# Patient Record
Sex: MALE | Race: WHITE | Employment: FULL TIME | URBAN - METROPOLITAN AREA
[De-identification: names, ages, dates, MRNs, and addresses within clinical notes are randomized per-mention and may not be internally consistent; named-entity substitution may affect disease eponyms.]

---

## 2017-04-13 ENCOUNTER — OFFICE VISIT (OUTPATIENT)
Dept: FAMILY MEDICINE CLINIC | Age: 57
End: 2017-04-13
Payer: MEDICARE

## 2017-04-13 VITALS
WEIGHT: 248 LBS | HEART RATE: 87 BPM | SYSTOLIC BLOOD PRESSURE: 128 MMHG | BODY MASS INDEX: 36.73 KG/M2 | TEMPERATURE: 97.5 F | DIASTOLIC BLOOD PRESSURE: 70 MMHG | HEIGHT: 69 IN

## 2017-04-13 DIAGNOSIS — R21 RASH AND NONSPECIFIC SKIN ERUPTION: Primary | ICD-10-CM

## 2017-04-13 PROBLEM — Z86.718 H/O DEEP VENOUS THROMBOSIS: Status: ACTIVE | Noted: 2017-02-27

## 2017-04-13 PROBLEM — I87.009 POST-PHLEBITIC SYNDROME: Status: ACTIVE | Noted: 2017-02-27

## 2017-04-13 PROCEDURE — 99212 OFFICE O/P EST SF 10 MIN: CPT | Performed by: FAMILY MEDICINE

## 2017-04-13 ASSESSMENT — PATIENT HEALTH QUESTIONNAIRE - PHQ9
SUM OF ALL RESPONSES TO PHQ9 QUESTIONS 1 & 2: 0
1. LITTLE INTEREST OR PLEASURE IN DOING THINGS: 0
2. FEELING DOWN, DEPRESSED OR HOPELESS: 0
SUM OF ALL RESPONSES TO PHQ QUESTIONS 1-9: 0

## 2017-04-13 ASSESSMENT — ENCOUNTER SYMPTOMS
ABDOMINAL PAIN: 0
SORE THROAT: 0
SHORTNESS OF BREATH: 0
NAUSEA: 0

## 2017-04-27 ENCOUNTER — TELEPHONE (OUTPATIENT)
Dept: FAMILY MEDICINE CLINIC | Age: 57
End: 2017-04-27

## 2017-05-05 ENCOUNTER — TELEPHONE (OUTPATIENT)
Dept: FAMILY MEDICINE CLINIC | Age: 57
End: 2017-05-05

## 2018-04-09 ENCOUNTER — OFFICE VISIT (OUTPATIENT)
Dept: FAMILY MEDICINE CLINIC | Age: 58
End: 2018-04-09
Payer: MEDICARE

## 2018-04-09 VITALS
OXYGEN SATURATION: 98 % | DIASTOLIC BLOOD PRESSURE: 82 MMHG | WEIGHT: 264 LBS | SYSTOLIC BLOOD PRESSURE: 129 MMHG | BODY MASS INDEX: 39.1 KG/M2 | HEART RATE: 90 BPM | TEMPERATURE: 97.4 F | HEIGHT: 69 IN

## 2018-04-09 DIAGNOSIS — M25.561 ACUTE PAIN OF RIGHT KNEE: ICD-10-CM

## 2018-04-09 DIAGNOSIS — R35.0 FREQUENCY OF URINATION: Primary | ICD-10-CM

## 2018-04-09 LAB
BILIRUBIN, POC: NEGATIVE
BLOOD URINE, POC: NEGATIVE
CLARITY, POC: CLEAR
COLOR, POC: YELLOW
GLUCOSE URINE, POC: NEGATIVE
KETONES, POC: NEGATIVE
LEUKOCYTE EST, POC: NEGATIVE
NITRITE, POC: NEGATIVE
PH, POC: 6.5
PROTEIN, POC: NEGATIVE
SPECIFIC GRAVITY, POC: 1.03
UROBILINOGEN, POC: 0.2

## 2018-04-09 PROCEDURE — 99214 OFFICE O/P EST MOD 30 MIN: CPT | Performed by: FAMILY MEDICINE

## 2018-04-09 PROCEDURE — 3017F COLORECTAL CA SCREEN DOC REV: CPT | Performed by: FAMILY MEDICINE

## 2018-04-09 PROCEDURE — G8417 CALC BMI ABV UP PARAM F/U: HCPCS | Performed by: FAMILY MEDICINE

## 2018-04-09 PROCEDURE — 1036F TOBACCO NON-USER: CPT | Performed by: FAMILY MEDICINE

## 2018-04-09 PROCEDURE — 81003 URINALYSIS AUTO W/O SCOPE: CPT | Performed by: FAMILY MEDICINE

## 2018-04-09 PROCEDURE — G8427 DOCREV CUR MEDS BY ELIG CLIN: HCPCS | Performed by: FAMILY MEDICINE

## 2018-04-09 RX ORDER — HYDROCODONE BITARTRATE AND ACETAMINOPHEN 5; 325 MG/1; MG/1
1 TABLET ORAL NIGHTLY
Qty: 10 TABLET | Refills: 0 | Status: SHIPPED | OUTPATIENT
Start: 2018-04-09 | End: 2018-04-19

## 2018-04-09 ASSESSMENT — ENCOUNTER SYMPTOMS
VOMITING: 0
GASTROINTESTINAL NEGATIVE: 1
EYES NEGATIVE: 1
RESPIRATORY NEGATIVE: 1
ALLERGIC/IMMUNOLOGIC NEGATIVE: 1

## 2018-04-10 ENCOUNTER — HOSPITAL ENCOUNTER (OUTPATIENT)
Age: 58
Discharge: HOME | End: 2018-04-10
Payer: MEDICARE

## 2018-04-10 DIAGNOSIS — R35.0 FREQUENCY OF URINATION: ICD-10-CM

## 2018-04-10 DIAGNOSIS — M25.561 ACUTE PAIN OF RIGHT KNEE: ICD-10-CM

## 2018-04-10 LAB
CHOLESTEROL/HDL RATIO: 4.7
CHOLESTEROL: 189 MG/DL
ESTIMATED AVERAGE GLUCOSE: 128 MG/DL
HBA1C MFR BLD: 6.1 % (ref 4–6)
HDLC SERPL-MCNC: 40 MG/DL
LDL CHOLESTEROL: 101 MG/DL (ref 0–130)
PROSTATE SPECIFIC ANTIGEN: 1.11 UG/L
TRIGL SERPL-MCNC: 239 MG/DL
VLDLC SERPL CALC-MCNC: ABNORMAL MG/DL (ref 1–30)

## 2018-04-10 PROCEDURE — G0103 PSA SCREENING: HCPCS

## 2018-04-10 PROCEDURE — 80061 LIPID PANEL: CPT

## 2018-04-10 PROCEDURE — 83036 HEMOGLOBIN GLYCOSYLATED A1C: CPT

## 2018-04-10 PROCEDURE — 36415 COLL VENOUS BLD VENIPUNCTURE: CPT

## 2018-04-11 ENCOUNTER — HOSPITAL ENCOUNTER (OUTPATIENT)
Dept: VASCULAR LAB | Age: 58
Discharge: HOME | End: 2018-04-11
Payer: MEDICARE

## 2018-04-11 DIAGNOSIS — R35.0 FREQUENCY OF URINATION: ICD-10-CM

## 2018-04-11 DIAGNOSIS — M25.561 ACUTE PAIN OF RIGHT KNEE: ICD-10-CM

## 2018-04-11 PROCEDURE — 93971 EXTREMITY STUDY: CPT

## 2018-04-13 ENCOUNTER — TELEPHONE (OUTPATIENT)
Dept: FAMILY MEDICINE CLINIC | Age: 58
End: 2018-04-13

## 2019-11-13 ENCOUNTER — HOSPITAL ENCOUNTER (EMERGENCY)
Age: 59
Discharge: HOME | End: 2019-11-13
Attending: EMERGENCY MEDICINE
Payer: MEDICARE

## 2019-11-13 VITALS
DIASTOLIC BLOOD PRESSURE: 88 MMHG | WEIGHT: 264 LBS | OXYGEN SATURATION: 97 % | HEART RATE: 97 BPM | BODY MASS INDEX: 39.1 KG/M2 | TEMPERATURE: 98.2 F | HEIGHT: 69 IN | RESPIRATION RATE: 16 BRPM | SYSTOLIC BLOOD PRESSURE: 170 MMHG

## 2019-11-13 DIAGNOSIS — R30.0 DYSURIA: Primary | ICD-10-CM

## 2019-11-13 DIAGNOSIS — N34.2 URETHRITIS: ICD-10-CM

## 2019-11-13 DIAGNOSIS — N30.00 ACUTE CYSTITIS WITHOUT HEMATURIA: ICD-10-CM

## 2019-11-13 LAB
-: ABNORMAL
AMORPHOUS: ABNORMAL
BACTERIA: ABNORMAL
BILIRUBIN URINE: NEGATIVE
CASTS UA: ABNORMAL /LPF
COLOR: YELLOW
COMMENT UA: ABNORMAL
CRYSTALS, UA: ABNORMAL /HPF
EPITHELIAL CELLS UA: ABNORMAL /HPF
GLUCOSE URINE: NEGATIVE
KETONES, URINE: NEGATIVE
LEUKOCYTE ESTERASE, URINE: ABNORMAL
MUCUS: ABNORMAL
NITRITE, URINE: NEGATIVE
OTHER OBSERVATIONS UA: ABNORMAL
PH UA: 7 (ref 5–8)
PROTEIN UA: ABNORMAL
RBC UA: ABNORMAL /HPF
RENAL EPITHELIAL, UA: ABNORMAL /HPF
SPECIFIC GRAVITY UA: 1.02 (ref 1–1.03)
TRICHOMONAS: ABNORMAL
TURBIDITY: ABNORMAL
URINE HGB: ABNORMAL
UROBILINOGEN, URINE: NORMAL
WBC UA: ABNORMAL /HPF
YEAST: ABNORMAL

## 2019-11-13 PROCEDURE — 99283 EMERGENCY DEPT VISIT LOW MDM: CPT

## 2019-11-13 PROCEDURE — 81001 URINALYSIS AUTO W/SCOPE: CPT

## 2019-11-13 PROCEDURE — 6360000002 HC RX W HCPCS: Performed by: PHYSICIAN ASSISTANT

## 2019-11-13 PROCEDURE — 87491 CHLMYD TRACH DNA AMP PROBE: CPT

## 2019-11-13 PROCEDURE — 6370000000 HC RX 637 (ALT 250 FOR IP): Performed by: PHYSICIAN ASSISTANT

## 2019-11-13 PROCEDURE — 96372 THER/PROPH/DIAG INJ SC/IM: CPT

## 2019-11-13 PROCEDURE — 87591 N.GONORRHOEAE DNA AMP PROB: CPT

## 2019-11-13 RX ORDER — CEFTRIAXONE SODIUM 250 MG/1
250 INJECTION, POWDER, FOR SOLUTION INTRAMUSCULAR; INTRAVENOUS ONCE
Status: COMPLETED | OUTPATIENT
Start: 2019-11-13 | End: 2019-11-13

## 2019-11-13 RX ORDER — SULFAMETHOXAZOLE AND TRIMETHOPRIM 800; 160 MG/1; MG/1
1 TABLET ORAL 2 TIMES DAILY
Qty: 14 TABLET | Refills: 0 | Status: SHIPPED | OUTPATIENT
Start: 2019-11-13 | End: 2019-11-20

## 2019-11-13 RX ORDER — AZITHROMYCIN 250 MG/1
1000 TABLET, FILM COATED ORAL ONCE
Status: COMPLETED | OUTPATIENT
Start: 2019-11-13 | End: 2019-11-13

## 2019-11-13 RX ADMIN — CEFTRIAXONE SODIUM 250 MG: 250 INJECTION, POWDER, FOR SOLUTION INTRAMUSCULAR; INTRAVENOUS at 20:48

## 2019-11-13 RX ADMIN — AZITHROMYCIN 1000 MG: 250 TABLET, FILM COATED ORAL at 20:47

## 2019-11-14 ENCOUNTER — TELEPHONE (OUTPATIENT)
Dept: FAMILY MEDICINE CLINIC | Age: 59
End: 2019-11-14

## 2019-11-14 LAB
C. TRACHOMATIS DNA ,URINE: NEGATIVE
N. GONORRHOEAE DNA, URINE: ABNORMAL
SPECIMEN DESCRIPTION: ABNORMAL

## 2020-05-24 ENCOUNTER — HOSPITAL ENCOUNTER (OUTPATIENT)
Age: 60
Setting detail: OBSERVATION
Discharge: HOME | End: 2020-05-26
Attending: EMERGENCY MEDICINE | Admitting: SURGERY
Payer: MEDICARE

## 2020-05-24 ENCOUNTER — APPOINTMENT (OUTPATIENT)
Dept: CT IMAGING | Age: 60
End: 2020-05-24
Payer: MEDICARE

## 2020-05-24 ENCOUNTER — APPOINTMENT (OUTPATIENT)
Dept: GENERAL RADIOLOGY | Age: 60
End: 2020-05-24
Payer: MEDICARE
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
## 2020-05-24 LAB
ABO/RH: NORMAL
ALLEN TEST: ABNORMAL
ANION GAP SERPL CALCULATED.3IONS-SCNC: 13 MMOL/L (ref 9–17)
ANTIBODY SCREEN: NEGATIVE
ARM BAND NUMBER: NORMAL
BLOOD BANK SPECIMEN: ABNORMAL
BUN BLDV-MCNC: 23 MG/DL (ref 8–23)
CARBOXYHEMOGLOBIN: 0.6 % (ref 0–5)
CHLORIDE BLD-SCNC: 107 MMOL/L (ref 98–107)
CO2: 20 MMOL/L (ref 20–31)
CREAT SERPL-MCNC: 1.18 MG/DL (ref 0.7–1.2)
ETHANOL PERCENT: <0.01 %
ETHANOL: <10 MG/DL
EXPIRATION DATE: NORMAL
FIO2: ABNORMAL
GFR AFRICAN AMERICAN: ABNORMAL ML/MIN
GFR NON-AFRICAN AMERICAN: ABNORMAL ML/MIN
GFR SERPL CREATININE-BSD FRML MDRD: ABNORMAL ML/MIN/{1.73_M2}
GFR SERPL CREATININE-BSD FRML MDRD: ABNORMAL ML/MIN/{1.73_M2}
GLUCOSE BLD-MCNC: 105 MG/DL (ref 70–99)
HCG QUALITATIVE: ABNORMAL
HCO3 VENOUS: 25.9 MMOL/L (ref 24–30)
HCT VFR BLD CALC: 44.9 % (ref 40.7–50.3)
HEMOGLOBIN: 14.3 G/DL (ref 13–17)
INR BLD: 1
MCH RBC QN AUTO: 33.5 PG (ref 25.2–33.5)
MCHC RBC AUTO-ENTMCNC: 31.8 G/DL (ref 28.4–34.8)
MCV RBC AUTO: 105.2 FL (ref 82.6–102.9)
METHEMOGLOBIN: ABNORMAL % (ref 0–1.5)
MODE: ABNORMAL
NEGATIVE BASE EXCESS, VEN: ABNORMAL MMOL/L (ref 0–2)
NOTIFICATION TIME: ABNORMAL
NOTIFICATION: ABNORMAL
NRBC AUTOMATED: 0 PER 100 WBC
O2 DEVICE/FLOW/%: ABNORMAL
O2 SAT, VEN: 57.6 % (ref 60–85)
OXYHEMOGLOBIN: ABNORMAL % (ref 95–98)
PARTIAL THROMBOPLASTIN TIME: 18.8 SEC (ref 20.5–30.5)
PATIENT TEMP: 37
PCO2, VEN, TEMP ADJ: ABNORMAL MMHG (ref 39–55)
PCO2, VEN: 44 (ref 39–55)
PDW BLD-RTO: 12.5 % (ref 11.8–14.4)
PEEP/CPAP: ABNORMAL
PH VENOUS: 7.39 (ref 7.32–7.42)
PH, VEN, TEMP ADJ: ABNORMAL (ref 7.32–7.42)
PLATELET # BLD: 191 K/UL (ref 138–453)
PMV BLD AUTO: 10.5 FL (ref 8.1–13.5)
PO2, VEN, TEMP ADJ: ABNORMAL MMHG (ref 30–50)
PO2, VEN: 34.9 (ref 30–50)
POSITIVE BASE EXCESS, VEN: 1.1 MMOL/L (ref 0–2)
POTASSIUM SERPL-SCNC: 4.1 MMOL/L (ref 3.7–5.3)
PROTHROMBIN TIME: 10.4 SEC (ref 9–12)
PSV: ABNORMAL
PT. POSITION: ABNORMAL
RBC # BLD: 4.27 M/UL (ref 4.21–5.77)
RESPIRATORY RATE: ABNORMAL
SAMPLE SITE: ABNORMAL
SET RATE: ABNORMAL
SODIUM BLD-SCNC: 140 MMOL/L (ref 135–144)
TEXT FOR RESPIRATORY: ABNORMAL
TOTAL HB: ABNORMAL G/DL (ref 12–16)
TOTAL RATE: ABNORMAL
VT: ABNORMAL
WBC # BLD: 7.7 K/UL (ref 3.5–11.3)

## 2020-05-24 PROCEDURE — 74177 CT ABD & PELVIS W/CONTRAST: CPT

## 2020-05-24 PROCEDURE — 84703 CHORIONIC GONADOTROPIN ASSAY: CPT

## 2020-05-24 PROCEDURE — 12001 RPR S/N/AX/GEN/TRNK 2.5CM/<: CPT

## 2020-05-24 PROCEDURE — G0378 HOSPITAL OBSERVATION PER HR: HCPCS

## 2020-05-24 PROCEDURE — 85027 COMPLETE CBC AUTOMATED: CPT

## 2020-05-24 PROCEDURE — 86900 BLOOD TYPING SEROLOGIC ABO: CPT

## 2020-05-24 PROCEDURE — 86901 BLOOD TYPING SEROLOGIC RH(D): CPT

## 2020-05-24 PROCEDURE — 72125 CT NECK SPINE W/O DYE: CPT

## 2020-05-24 PROCEDURE — 86850 RBC ANTIBODY SCREEN: CPT

## 2020-05-24 PROCEDURE — 73130 X-RAY EXAM OF HAND: CPT

## 2020-05-24 PROCEDURE — 72131 CT LUMBAR SPINE W/O DYE: CPT

## 2020-05-24 PROCEDURE — G0480 DRUG TEST DEF 1-7 CLASSES: HCPCS

## 2020-05-24 PROCEDURE — 73562 X-RAY EXAM OF KNEE 3: CPT

## 2020-05-24 PROCEDURE — 6360000004 HC RX CONTRAST MEDICATION: Performed by: EMERGENCY MEDICINE

## 2020-05-24 PROCEDURE — 72128 CT CHEST SPINE W/O DYE: CPT

## 2020-05-24 PROCEDURE — 93005 ELECTROCARDIOGRAM TRACING: CPT | Performed by: SURGERY

## 2020-05-24 PROCEDURE — 70450 CT HEAD/BRAIN W/O DYE: CPT

## 2020-05-24 PROCEDURE — 85610 PROTHROMBIN TIME: CPT

## 2020-05-24 PROCEDURE — 82947 ASSAY GLUCOSE BLOOD QUANT: CPT

## 2020-05-24 PROCEDURE — 82805 BLOOD GASES W/O2 SATURATION: CPT

## 2020-05-24 PROCEDURE — 84520 ASSAY OF UREA NITROGEN: CPT

## 2020-05-24 PROCEDURE — 80051 ELECTROLYTE PANEL: CPT

## 2020-05-24 PROCEDURE — 71045 X-RAY EXAM CHEST 1 VIEW: CPT

## 2020-05-24 PROCEDURE — 82565 ASSAY OF CREATININE: CPT

## 2020-05-24 PROCEDURE — 99285 EMERGENCY DEPT VISIT HI MDM: CPT

## 2020-05-24 PROCEDURE — 12011 RPR F/E/E/N/L/M 2.5 CM/<: CPT

## 2020-05-24 PROCEDURE — 85730 THROMBOPLASTIN TIME PARTIAL: CPT

## 2020-05-24 RX ORDER — FENTANYL CITRATE 50 UG/ML
INJECTION, SOLUTION INTRAMUSCULAR; INTRAVENOUS
Status: DISPENSED
Start: 2020-05-24 | End: 2020-05-25

## 2020-05-24 RX ORDER — CEFAZOLIN SODIUM 1 G/50ML
INJECTION, SOLUTION INTRAVENOUS
Status: DISPENSED
Start: 2020-05-24 | End: 2020-05-25

## 2020-05-24 RX ORDER — ONDANSETRON 2 MG/ML
4 INJECTION INTRAMUSCULAR; INTRAVENOUS EVERY 6 HOURS PRN
Status: CANCELLED | OUTPATIENT
Start: 2020-05-24

## 2020-05-24 RX ORDER — PROMETHAZINE HYDROCHLORIDE 25 MG/1
12.5 TABLET ORAL EVERY 6 HOURS PRN
Status: CANCELLED | OUTPATIENT
Start: 2020-05-24

## 2020-05-24 RX ORDER — ACETAMINOPHEN 500 MG
1000 TABLET ORAL EVERY 8 HOURS SCHEDULED
Status: DISCONTINUED | OUTPATIENT
Start: 2020-05-24 | End: 2020-05-26 | Stop reason: HOSPADM

## 2020-05-24 RX ORDER — FENTANYL CITRATE 50 UG/ML
50 INJECTION, SOLUTION INTRAMUSCULAR; INTRAVENOUS ONCE
Status: DISCONTINUED | OUTPATIENT
Start: 2020-05-24 | End: 2020-05-26 | Stop reason: HOSPADM

## 2020-05-24 RX ADMIN — IOHEXOL 130 ML: 350 INJECTION, SOLUTION INTRAVENOUS at 21:34

## 2020-05-25 ENCOUNTER — APPOINTMENT (OUTPATIENT)
Dept: GENERAL RADIOLOGY | Age: 60
End: 2020-05-25
Payer: MEDICARE

## 2020-05-25 LAB
ANION GAP SERPL CALCULATED.3IONS-SCNC: 11 MMOL/L (ref 9–17)
BUN BLDV-MCNC: 23 MG/DL (ref 8–23)
BUN/CREAT BLD: ABNORMAL (ref 9–20)
CALCIUM SERPL-MCNC: 8.4 MG/DL (ref 8.6–10.4)
CHLORIDE BLD-SCNC: 105 MMOL/L (ref 98–107)
CO2: 22 MMOL/L (ref 20–31)
CREAT SERPL-MCNC: 0.95 MG/DL (ref 0.7–1.2)
GFR AFRICAN AMERICAN: >60 ML/MIN
GFR NON-AFRICAN AMERICAN: >60 ML/MIN
GFR SERPL CREATININE-BSD FRML MDRD: ABNORMAL ML/MIN/{1.73_M2}
GFR SERPL CREATININE-BSD FRML MDRD: ABNORMAL ML/MIN/{1.73_M2}
GLUCOSE BLD-MCNC: 154 MG/DL (ref 70–99)
HCT VFR BLD CALC: 39 % (ref 40.7–50.3)
HEMOGLOBIN: 12.8 G/DL (ref 13–17)
INR BLD: 1
MCH RBC QN AUTO: 32.5 PG (ref 25.2–33.5)
MCHC RBC AUTO-ENTMCNC: 32.8 G/DL (ref 28.4–34.8)
MCV RBC AUTO: 99 FL (ref 82.6–102.9)
NRBC AUTOMATED: 0 PER 100 WBC
PDW BLD-RTO: 12.5 % (ref 11.8–14.4)
PLATELET # BLD: 204 K/UL (ref 138–453)
PMV BLD AUTO: 10.6 FL (ref 8.1–13.5)
POTASSIUM SERPL-SCNC: 4.3 MMOL/L (ref 3.7–5.3)
PROTHROMBIN TIME: 10.8 SEC (ref 9–12)
RBC # BLD: 3.94 M/UL (ref 4.21–5.77)
SODIUM BLD-SCNC: 138 MMOL/L (ref 135–144)
WBC # BLD: 9.3 K/UL (ref 3.5–11.3)

## 2020-05-25 PROCEDURE — 92523 SPEECH SOUND LANG COMPREHEN: CPT

## 2020-05-25 PROCEDURE — 80307 DRUG TEST PRSMV CHEM ANLYZR: CPT

## 2020-05-25 PROCEDURE — 6370000000 HC RX 637 (ALT 250 FOR IP): Performed by: GENERAL PRACTICE

## 2020-05-25 PROCEDURE — 97530 THERAPEUTIC ACTIVITIES: CPT

## 2020-05-25 PROCEDURE — 85610 PROTHROMBIN TIME: CPT

## 2020-05-25 PROCEDURE — 73030 X-RAY EXAM OF SHOULDER: CPT

## 2020-05-25 PROCEDURE — 97166 OT EVAL MOD COMPLEX 45 MIN: CPT

## 2020-05-25 PROCEDURE — 6360000002 HC RX W HCPCS: Performed by: STUDENT IN AN ORGANIZED HEALTH CARE EDUCATION/TRAINING PROGRAM

## 2020-05-25 PROCEDURE — 80048 BASIC METABOLIC PNL TOTAL CA: CPT

## 2020-05-25 PROCEDURE — 85027 COMPLETE CBC AUTOMATED: CPT

## 2020-05-25 PROCEDURE — G0378 HOSPITAL OBSERVATION PER HR: HCPCS

## 2020-05-25 PROCEDURE — 6370000000 HC RX 637 (ALT 250 FOR IP): Performed by: STUDENT IN AN ORGANIZED HEALTH CARE EDUCATION/TRAINING PROGRAM

## 2020-05-25 PROCEDURE — 2580000003 HC RX 258: Performed by: SURGERY

## 2020-05-25 PROCEDURE — 96374 THER/PROPH/DIAG INJ IV PUSH: CPT

## 2020-05-25 PROCEDURE — 96375 TX/PRO/DX INJ NEW DRUG ADDON: CPT

## 2020-05-25 PROCEDURE — 97535 SELF CARE MNGMENT TRAINING: CPT

## 2020-05-25 PROCEDURE — 96376 TX/PRO/DX INJ SAME DRUG ADON: CPT

## 2020-05-25 PROCEDURE — 97162 PT EVAL MOD COMPLEX 30 MIN: CPT

## 2020-05-25 RX ORDER — METHOCARBAMOL 750 MG/1
750 TABLET, FILM COATED ORAL 3 TIMES DAILY
Status: DISCONTINUED | OUTPATIENT
Start: 2020-05-25 | End: 2020-05-26 | Stop reason: HOSPADM

## 2020-05-25 RX ORDER — GINSENG 100 MG
CAPSULE ORAL
Qty: 28 G | Refills: 1 | Status: SHIPPED | OUTPATIENT
Start: 2020-05-25 | End: 2020-06-04

## 2020-05-25 RX ORDER — OXYCODONE HYDROCHLORIDE 5 MG/1
5 TABLET ORAL EVERY 4 HOURS PRN
Status: DISCONTINUED | OUTPATIENT
Start: 2020-05-25 | End: 2020-05-26 | Stop reason: HOSPADM

## 2020-05-25 RX ORDER — GINSENG 100 MG
CAPSULE ORAL 3 TIMES DAILY
Status: DISCONTINUED | OUTPATIENT
Start: 2020-05-25 | End: 2020-05-26 | Stop reason: HOSPADM

## 2020-05-25 RX ORDER — SODIUM CHLORIDE 0.9 % (FLUSH) 0.9 %
10 SYRINGE (ML) INJECTION PRN
Status: DISCONTINUED | OUTPATIENT
Start: 2020-05-25 | End: 2020-05-26 | Stop reason: HOSPADM

## 2020-05-25 RX ORDER — FENTANYL CITRATE 50 UG/ML
50 INJECTION, SOLUTION INTRAMUSCULAR; INTRAVENOUS ONCE
Status: DISCONTINUED | OUTPATIENT
Start: 2020-05-25 | End: 2020-05-26 | Stop reason: HOSPADM

## 2020-05-25 RX ORDER — SODIUM CHLORIDE 0.9 % (FLUSH) 0.9 %
10 SYRINGE (ML) INJECTION EVERY 12 HOURS SCHEDULED
Status: DISCONTINUED | OUTPATIENT
Start: 2020-05-25 | End: 2020-05-26 | Stop reason: HOSPADM

## 2020-05-25 RX ORDER — CELECOXIB 100 MG/1
50 CAPSULE ORAL 2 TIMES DAILY
COMMUNITY

## 2020-05-25 RX ORDER — ONDANSETRON 2 MG/ML
4 INJECTION INTRAMUSCULAR; INTRAVENOUS EVERY 6 HOURS PRN
Status: DISCONTINUED | OUTPATIENT
Start: 2020-05-25 | End: 2020-05-26 | Stop reason: HOSPADM

## 2020-05-25 RX ORDER — POLYETHYLENE GLYCOL 3350 17 G/17G
17 POWDER, FOR SOLUTION ORAL DAILY PRN
Status: DISCONTINUED | OUTPATIENT
Start: 2020-05-25 | End: 2020-05-26 | Stop reason: HOSPADM

## 2020-05-25 RX ORDER — METHOCARBAMOL 750 MG/1
750 TABLET, FILM COATED ORAL 3 TIMES DAILY
Qty: 30 TABLET | Refills: 0 | Status: SHIPPED | OUTPATIENT
Start: 2020-05-25 | End: 2020-06-04

## 2020-05-25 RX ORDER — IBUPROFEN 800 MG/1
800 TABLET ORAL EVERY 8 HOURS PRN
Qty: 30 TABLET | Refills: 0 | Status: SHIPPED | OUTPATIENT
Start: 2020-05-25

## 2020-05-25 RX ORDER — OXYCODONE HYDROCHLORIDE 5 MG/1
5 TABLET ORAL EVERY 6 HOURS PRN
Qty: 15 TABLET | Refills: 0 | Status: SHIPPED | OUTPATIENT
Start: 2020-05-25 | End: 2020-05-30

## 2020-05-25 RX ORDER — CETIRIZINE HYDROCHLORIDE 10 MG/1
10 TABLET ORAL DAILY
Status: DISCONTINUED | OUTPATIENT
Start: 2020-05-25 | End: 2020-05-26 | Stop reason: HOSPADM

## 2020-05-25 RX ORDER — LORATADINE 10 MG/1
10 CAPSULE, LIQUID FILLED ORAL DAILY
COMMUNITY

## 2020-05-25 RX ORDER — CELECOXIB 50 MG/1
50 CAPSULE ORAL 2 TIMES DAILY
Status: DISCONTINUED | OUTPATIENT
Start: 2020-05-25 | End: 2020-05-26 | Stop reason: HOSPADM

## 2020-05-25 RX ADMIN — ACETAMINOPHEN 1000 MG: 500 TABLET ORAL at 11:13

## 2020-05-25 RX ADMIN — OXYCODONE HYDROCHLORIDE 5 MG: 5 TABLET ORAL at 10:23

## 2020-05-25 RX ADMIN — BACITRACIN: 500 OINTMENT TOPICAL at 13:05

## 2020-05-25 RX ADMIN — CELECOXIB 50 MG: 50 CAPSULE ORAL at 14:07

## 2020-05-25 RX ADMIN — METHOCARBAMOL TABLETS 750 MG: 750 TABLET, COATED ORAL at 19:01

## 2020-05-25 RX ADMIN — CETIRIZINE HYDROCHLORIDE 10 MG: 10 TABLET ORAL at 14:07

## 2020-05-25 RX ADMIN — METHOCARBAMOL TABLETS 750 MG: 750 TABLET, COATED ORAL at 11:13

## 2020-05-25 RX ADMIN — OXYCODONE HYDROCHLORIDE 5 MG: 5 TABLET ORAL at 17:23

## 2020-05-25 RX ADMIN — APIXABAN 5 MG: 5 TABLET, FILM COATED ORAL at 14:07

## 2020-05-25 RX ADMIN — HYDROMORPHONE HYDROCHLORIDE 0.5 MG: 1 INJECTION, SOLUTION INTRAMUSCULAR; INTRAVENOUS; SUBCUTANEOUS at 09:08

## 2020-05-25 RX ADMIN — OXYCODONE HYDROCHLORIDE 5 MG: 5 TABLET ORAL at 22:23

## 2020-05-25 RX ADMIN — BACITRACIN: 500 OINTMENT TOPICAL at 21:06

## 2020-05-25 RX ADMIN — ACETAMINOPHEN 1000 MG: 500 TABLET ORAL at 19:01

## 2020-05-25 RX ADMIN — ONDANSETRON 4 MG: 2 INJECTION INTRAMUSCULAR; INTRAVENOUS at 17:23

## 2020-05-25 RX ADMIN — CELECOXIB 50 MG: 50 CAPSULE ORAL at 21:04

## 2020-05-25 RX ADMIN — ONDANSETRON 4 MG: 2 INJECTION INTRAMUSCULAR; INTRAVENOUS at 12:10

## 2020-05-25 RX ADMIN — Medication 10 ML: at 11:13

## 2020-05-25 RX ADMIN — APIXABAN 5 MG: 5 TABLET, FILM COATED ORAL at 21:04

## 2020-05-25 SDOH — HEALTH STABILITY: MENTAL HEALTH: HOW OFTEN DO YOU HAVE A DRINK CONTAINING ALCOHOL?: MONTHLY OR LESS

## 2020-05-25 SDOH — HEALTH STABILITY: MENTAL HEALTH: HOW MANY STANDARD DRINKS CONTAINING ALCOHOL DO YOU HAVE ON A TYPICAL DAY?: 1 OR 2

## 2020-05-25 ASSESSMENT — PAIN DESCRIPTION - LOCATION
LOCATION: LEG
LOCATION: GENERALIZED;KNEE;LEG
LOCATION: LEG

## 2020-05-25 ASSESSMENT — PAIN DESCRIPTION - DESCRIPTORS: DESCRIPTORS: ACHING

## 2020-05-25 ASSESSMENT — PAIN SCALES - GENERAL
PAINLEVEL_OUTOF10: 7
PAINLEVEL_OUTOF10: 10
PAINLEVEL_OUTOF10: 7
PAINLEVEL_OUTOF10: 8

## 2020-05-25 ASSESSMENT — PAIN DESCRIPTION - ORIENTATION
ORIENTATION: RIGHT

## 2020-05-25 ASSESSMENT — PAIN DESCRIPTION - PAIN TYPE
TYPE: ACUTE PAIN
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
## 2020-05-25 NOTE — PROGRESS NOTES
sitting EOB, pt unable to reach feet due to pain; educated patient on compensatory strategy of raising surface with good return )  Toileting: Supervision(OT facilitated toileting standing at toilet at supervision )  Tone RUE  RUE Tone: Normotonic  Tone LUE  LUE Tone: Normotonic  Coordination  Movements Are Fluid And Coordinated: Yes     Bed mobility  Supine to Sit: Stand by assistance  Sit to Supine: Stand by assistance  Scooting: Stand by assistance  Comment: increased time to complete   Transfers  Sit to stand: Contact guard assistance  Stand to sit: Stand by assistance     Cognition  Overall Cognitive Status: WFL        Sensation  Overall Sensation Status: Impaired(R thigh n/t - chronic )        LUE AROM : WFL  Left Hand AROM: WFL  RUE AROM : WFL  Right Hand AROM: WFL  LUE Strength  Gross LUE Strength: WFL  L Hand General: 5/5  RUE Strength  Gross RUE Strength: WFL  R Hand General: 5/5              Plan   Plan  Times per week: 3-4x/wk   Current Treatment Recommendations: Strengthening, Safety Education & Training, Patient/Caregiver Education & Training, Self-Care / ADL, Endurance Training, Equipment Evaluation, Education, & procurement    AM-PAC Score        [facility] Daily Activity Raw Score: 20 (05/25/20 1321)  AM-PAC Inpatient ADL T-Scale Score : 42.03 (05/25/20 1321)  ADL Inpatient CMS 0-100% Score: 38.32 (05/25/20 1321)  ADL Inpatient CMS G-Code Modifier : Tata Folds (05/25/20 1321)    Goals  Short term goals  Time Frame for Short term goals: Patient will, by discharge  Short term goal 1: demonstrate UB self-cares at Mod I   Short term goal 2: demonstrate LB self-cares at Mod I using AE PRN   Short term goal 3: demonstrate functional transfers/mobility using LRD at SBA to engage in ADLs safely   Short term goal 4: demonstrate ~10 min of dynamic standing tolerance using LRD at SBA to engage in ADLs safely        Therapy Time   Individual Concurrent Group Co-treatment   Time In 1021         Time Out 1040 Minutes 24         Timed Code Treatment Minutes: 353 Carlee Verduzco OTR/L

## 2020-05-25 NOTE — PROGRESS NOTES
Prieto Verdin was evaluated today and a DME order was entered for a wheeled walker and shower chair with back because he requires this to successfully complete daily living tasks of bathing and ambulating. A wheeled walker is necessary due to the patient's unsteady gait, upper body weakness, and inability to  an ambulation device; and he can ambulate only by pushing a walker instead of a lesser assistive device such as a cane, crutch, or standard walker. The need for this equipment was discussed with the patient and he understands and is in agreement.
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
## 2020-05-25 NOTE — ED PROVIDER NOTES
Emergency Medicine Attending Note    I have seen and examined the patient in conjunction with the Resident/MLP. Please see my key portion documented:      I agree with the assessment and plan as discussed with Dr. Yamilex Berumen. Electronically signed:  Fidel Reynaga M.D.           Jonathan Mora MD  05/24/20 [id number]
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
## 2020-05-26 ENCOUNTER — APPOINTMENT (OUTPATIENT)
Dept: GENERAL RADIOLOGY | Age: 60
End: 2020-05-26
Payer: MEDICARE

## 2020-05-26 ENCOUNTER — APPOINTMENT (OUTPATIENT)
Dept: MRI IMAGING | Age: 60
End: 2020-05-26
Payer: MEDICARE

## 2020-05-26 VITALS
WEIGHT: 260 LBS | TEMPERATURE: 98.3 F | RESPIRATION RATE: 16 BRPM | BODY MASS INDEX: 38.51 KG/M2 | HEART RATE: 68 BPM | DIASTOLIC BLOOD PRESSURE: 86 MMHG | SYSTOLIC BLOOD PRESSURE: 126 MMHG | HEIGHT: 69 IN | OXYGEN SATURATION: 97 %

## 2020-05-26 LAB
EKG ATRIAL RATE: 93 BPM
EKG P AXIS: 45 DEGREES
EKG P-R INTERVAL: 148 MS
EKG Q-T INTERVAL: 340 MS
EKG QRS DURATION: 80 MS
EKG QTC CALCULATION (BAZETT): 422 MS
EKG R AXIS: 10 DEGREES
EKG T AXIS: 27 DEGREES
EKG VENTRICULAR RATE: 93 BPM

## 2020-05-26 PROCEDURE — G0378 HOSPITAL OBSERVATION PER HR: HCPCS

## 2020-05-26 PROCEDURE — 97530 THERAPEUTIC ACTIVITIES: CPT

## 2020-05-26 PROCEDURE — 73590 X-RAY EXAM OF LOWER LEG: CPT

## 2020-05-26 PROCEDURE — 73721 MRI JNT OF LWR EXTRE W/O DYE: CPT

## 2020-05-26 PROCEDURE — 97116 GAIT TRAINING THERAPY: CPT

## 2020-05-26 PROCEDURE — 6370000000 HC RX 637 (ALT 250 FOR IP): Performed by: STUDENT IN AN ORGANIZED HEALTH CARE EDUCATION/TRAINING PROGRAM

## 2020-05-26 PROCEDURE — 6370000000 HC RX 637 (ALT 250 FOR IP): Performed by: GENERAL PRACTICE

## 2020-05-26 PROCEDURE — 94760 N-INVAS EAR/PLS OXIMETRY 1: CPT

## 2020-05-26 PROCEDURE — 2580000003 HC RX 258: Performed by: SURGERY

## 2020-05-26 PROCEDURE — 93010 ELECTROCARDIOGRAM REPORT: CPT | Performed by: INTERNAL MEDICINE

## 2020-05-26 PROCEDURE — 97110 THERAPEUTIC EXERCISES: CPT

## 2020-05-26 RX ADMIN — CETIRIZINE HYDROCHLORIDE 10 MG: 10 TABLET ORAL at 08:33

## 2020-05-26 RX ADMIN — METHOCARBAMOL TABLETS 750 MG: 750 TABLET, COATED ORAL at 11:43

## 2020-05-26 RX ADMIN — Medication 10 ML: at 08:33

## 2020-05-26 RX ADMIN — BACITRACIN: 500 OINTMENT TOPICAL at 08:33

## 2020-05-26 RX ADMIN — METHOCARBAMOL TABLETS 750 MG: 750 TABLET, COATED ORAL at 04:55

## 2020-05-26 RX ADMIN — OXYCODONE HYDROCHLORIDE 5 MG: 5 TABLET ORAL at 04:46

## 2020-05-26 RX ADMIN — ACETAMINOPHEN 1000 MG: 500 TABLET ORAL at 04:47

## 2020-05-26 RX ADMIN — ACETAMINOPHEN 1000 MG: 500 TABLET ORAL at 11:43

## 2020-05-26 RX ADMIN — OXYCODONE HYDROCHLORIDE 5 MG: 5 TABLET ORAL at 10:25

## 2020-05-26 RX ADMIN — BACITRACIN: 500 OINTMENT TOPICAL at 14:41

## 2020-05-26 RX ADMIN — APIXABAN 5 MG: 5 TABLET, FILM COATED ORAL at 08:33

## 2020-05-26 RX ADMIN — OXYCODONE HYDROCHLORIDE 5 MG: 5 TABLET ORAL at 14:40

## 2020-05-26 RX ADMIN — OXYCODONE HYDROCHLORIDE 5 MG: 5 TABLET ORAL at 18:35

## 2020-05-26 RX ADMIN — CELECOXIB 50 MG: 50 CAPSULE ORAL at 08:33

## 2020-05-26 ASSESSMENT — PAIN SCALES - GENERAL
PAINLEVEL_OUTOF10: 5
PAINLEVEL_OUTOF10: 5
PAINLEVEL_OUTOF10: 8
PAINLEVEL_OUTOF10: 7
PAINLEVEL_OUTOF10: 6

## 2020-05-26 ASSESSMENT — PAIN DESCRIPTION - LOCATION: LOCATION: KNEE

## 2020-05-26 ASSESSMENT — PAIN DESCRIPTION - ORIENTATION: ORIENTATION: RIGHT

## 2020-05-26 ASSESSMENT — PAIN DESCRIPTION - PAIN TYPE: TYPE: ACUTE PAIN
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
## 2020-05-26 NOTE — PROGRESS NOTES
Dispo planning     1000 Met with pt at bedside. Pt planning to return home with girlfriend. Pt has 3 steps to get into the house and pt's bed/bathroom of the second floor of the home. Pt does not report concerns with mobilization. Pt had all DME equipment needed order yesterday including a walker/shower chair. Will order outpatient PT/OT. Awaiting Orthopedic recs & MRI follow up.

## 2020-05-26 NOTE — PROGRESS NOTES
CLINICAL PHARMACY NOTE: MEDS TO [address] [place] [address] Drive Select Patient?: No  Total # of Prescriptions Filled: 4   The following medications were delivered to the patient:  · ROBAXIN   · BACITRACIN   · IBU  · OXYCODONE  Total # of Interventions Completed: 0  Time Spent (min): 0    Additional Documentation:

## 2020-05-26 NOTE — PLAN OF CARE
Problem: Pain:  Goal: Pain level will decrease  Description: Pain level will decrease  5/26/2020 1826 by Elise Flanagan RN  Outcome: Completed  5/26/2020 1457 by Elise Flanagan RN  Outcome: Ongoing  5/26/2020 0649 by Fariba Smith RN  Outcome: Ongoing  Goal: Control of acute pain  Description: Control of acute pain  5/26/2020 1826 by Elise Flanagan RN  Outcome: Completed  5/26/2020 1457 by Elise Flanagan RN  Outcome: Ongoing  5/26/2020 0649 by Fariba Smith RN  Outcome: Ongoing  Goal: Control of chronic pain  Description: Control of chronic pain  5/26/2020 1826 by Elise Flanagan RN  Outcome: Completed  5/26/2020 1457 by Elise Flanagan RN  Outcome: Ongoing     Problem: Falls - Risk of:  Goal: Will remain free from falls  Description: Will remain free from falls  5/26/2020 1826 by Elise Flanagan RN  Outcome: Completed  5/26/2020 1457 by Elise Flanagan RN  Outcome: Met This Shift  5/26/2020 0649 by Fariba Smith RN  Outcome: Ongoing  Goal: Absence of physical injury  Description: Absence of physical injury  5/26/2020 1826 by Elise Flanagan RN  Outcome: Completed  5/26/2020 1457 by Elise Flanagan RN  Outcome: Met This Shift  5/26/2020 0649 by Fariba Smith RN  Outcome: Ongoing
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
## 2020-05-26 NOTE — CARE COORDINATION
SBIRT- completed  Met with pt this date states he only drinks alcohol socially  Denies any drug use  Denies any suicidal ideations/depression          Alcohol Screening and Brief Intervention        Recent Labs     05/24/20 2059   ALC <10       Alcohol Pre-screening  (MEN ONLY) How many times in the past year have you had 5 or more drinks in a day?: None       Alcohol Screening Audit       Drug Pre-Screening   How many times in the past year have you used a recreational drug or used a prescription medication for nonmedical reasons?: None    Drug Screening DAST       Mood Pre-Screening (PHQ-2)  During the past two weeks, have you been bothered by little interest or pleasure in doing things?: No  During the past two weeks, have you been bothered by feeling down, depressed, or hopeless?: No    Mood Pre-Screening (PHQ-9)         I have interviewed Martina Luis Daniel, [id number] regarding  His alcohol consumption/drug use and risk for excessive use. Screenings were negative. Patient  N/A intervention at this time.      Deferred []    Completed on: 5/26/2020   [address] [facility]

## 2020-05-26 NOTE — PROGRESS NOTES
guard assistance  Comment: transfers performed with RW, verbal cues for UE placement with good return demo  Ambulation  Ambulation?: Yes  Ambulation 1  Surface: level tile  Device: Rolling Walker  Assistance: Stand by assistance  Quality of Gait: antalgic, decreased RLE WB, steady, slow susie  Distance: 70ft and 10 ft     Balance  Posture: Good  Sitting - Static: Good  Sitting - Dynamic: Good  Standing - Static: (Pt stood at the sink for ~8mins perfoming AdL requiring SBA  for safety. Intermittent Support on Rw d/t pain on R knee.)  Standing - Dynamic: Fair;+  Comments: standing balance assessed with RW  Exercises  Comments:Seated LE exercise program: Long Arc Quads, hip abduction/adduction, heel/toe raises, and marches.  Reps:x10    Goals  Short term goals  Time Frame for Short term goals: 14 visits  Short term goal 1: Perform bed mobility and functional transfers independently  Short term goal 2: Ambulate 300ft with least restrictive AD independently  Short term goal 3: Ascend/descend 1 flight of stairs with HR and CGA    Plan    Plan  Times per week: 5x/wk  Current Treatment Recommendations: Strengthening, ROM, Balance Training, Functional Mobility Training, Transfer Training, Safety Education & Training, Home Exercise Program, Patient/Caregiver Education & Training, Gait Training, Stair training, Endurance Training  Safety Devices  Type of devices: Nurse notified, Call light within reach, Gait belt, Left in chair, Chair alarm in place  Restraints  Initially in place: No     Therapy Time   Individual Concurrent Group Co-treatment   Time In 0930         Time Out 1014         Minutes [address] [place] [address]
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
## 2020-05-30 ENCOUNTER — APPOINTMENT (OUTPATIENT)
Dept: CT IMAGING | Age: 60
End: 2020-05-30
Payer: MEDICARE

## 2020-05-30 ENCOUNTER — HOSPITAL ENCOUNTER (OUTPATIENT)
Age: 60
Setting detail: OBSERVATION
Discharge: HOME | End: 2020-05-30
Attending: EMERGENCY MEDICINE | Admitting: INTERNAL MEDICINE
Payer: MEDICARE

## 2020-05-30 ENCOUNTER — APPOINTMENT (OUTPATIENT)
Dept: GENERAL RADIOLOGY | Age: 60
End: 2020-05-30
Payer: MEDICARE

## 2020-05-30 VITALS
DIASTOLIC BLOOD PRESSURE: 80 MMHG | HEART RATE: 95 BPM | WEIGHT: 260 LBS | SYSTOLIC BLOOD PRESSURE: 127 MMHG | TEMPERATURE: 98.3 F | BODY MASS INDEX: 38.51 KG/M2 | HEIGHT: 69 IN | OXYGEN SATURATION: 95 % | RESPIRATION RATE: 18 BRPM

## 2020-05-30 PROBLEM — R07.9 CHEST PAIN: Status: ACTIVE | Noted: 2020-05-30

## 2020-05-30 LAB
ABSOLUTE EOS #: 0.6 K/UL (ref 0–0.4)
ABSOLUTE IMMATURE GRANULOCYTE: ABNORMAL K/UL (ref 0–0.3)
ABSOLUTE LYMPH #: 2 K/UL (ref 1–4.8)
ABSOLUTE MONO #: 0.5 K/UL (ref 0.1–1.3)
ALBUMIN SERPL-MCNC: 3.7 G/DL (ref 3.5–5.2)
ALBUMIN/GLOBULIN RATIO: ABNORMAL (ref 1–2.5)
ALP BLD-CCNC: 51 U/L (ref 40–129)
ALT SERPL-CCNC: 57 U/L (ref 5–41)
ANION GAP SERPL CALCULATED.3IONS-SCNC: 11 MMOL/L (ref 9–17)
AST SERPL-CCNC: 16 U/L
BASOPHILS # BLD: 1 % (ref 0–2)
BASOPHILS ABSOLUTE: 0.1 K/UL (ref 0–0.2)
BILIRUB SERPL-MCNC: 0.71 MG/DL (ref 0.3–1.2)
BNP INTERPRETATION: NORMAL
BUN BLDV-MCNC: 20 MG/DL (ref 8–23)
BUN/CREAT BLD: ABNORMAL (ref 9–20)
CALCIUM SERPL-MCNC: 9.3 MG/DL (ref 8.6–10.4)
CHLORIDE BLD-SCNC: 99 MMOL/L (ref 98–107)
CO2: 25 MMOL/L (ref 20–31)
CREAT SERPL-MCNC: 0.94 MG/DL (ref 0.7–1.2)
DIFFERENTIAL TYPE: ABNORMAL
EKG ATRIAL RATE: 93 BPM
EKG P AXIS: 32 DEGREES
EKG P-R INTERVAL: 128 MS
EKG Q-T INTERVAL: 334 MS
EKG QRS DURATION: 84 MS
EKG QTC CALCULATION (BAZETT): 415 MS
EKG T AXIS: 13 DEGREES
EKG VENTRICULAR RATE: 93 BPM
EOSINOPHILS RELATIVE PERCENT: 9 % (ref 0–4)
GFR AFRICAN AMERICAN: >60 ML/MIN
GFR NON-AFRICAN AMERICAN: >60 ML/MIN
GFR SERPL CREATININE-BSD FRML MDRD: ABNORMAL ML/MIN/{1.73_M2}
GFR SERPL CREATININE-BSD FRML MDRD: ABNORMAL ML/MIN/{1.73_M2}
GLUCOSE BLD-MCNC: 127 MG/DL (ref 70–99)
HCT VFR BLD CALC: 37 % (ref 41–53)
HEMOGLOBIN: 12.7 G/DL (ref 13.5–17.5)
IMMATURE GRANULOCYTES: ABNORMAL %
LYMPHOCYTES # BLD: 30 % (ref 24–44)
MCH RBC QN AUTO: 33.2 PG (ref 26–34)
MCHC RBC AUTO-ENTMCNC: 34.4 G/DL (ref 31–37)
MCV RBC AUTO: 96.5 FL (ref 80–100)
MONOCYTES # BLD: 7 % (ref 1–7)
NRBC AUTOMATED: ABNORMAL PER 100 WBC
PDW BLD-RTO: 12.8 % (ref 11.5–14.9)
PLATELET # BLD: 241 K/UL (ref 150–450)
PLATELET ESTIMATE: ABNORMAL
PMV BLD AUTO: 8.8 FL (ref 6–12)
POTASSIUM SERPL-SCNC: 4.2 MMOL/L (ref 3.7–5.3)
PRO-BNP: 67 PG/ML
RBC # BLD: 3.83 M/UL (ref 4.5–5.9)
RBC # BLD: ABNORMAL 10*6/UL
SEG NEUTROPHILS: 53 % (ref 36–66)
SEGMENTED NEUTROPHILS ABSOLUTE COUNT: 3.5 K/UL (ref 1.3–9.1)
SODIUM BLD-SCNC: 135 MMOL/L (ref 135–144)
TOTAL PROTEIN: 6.5 G/DL (ref 6.4–8.3)
TROPONIN INTERP: NORMAL
TROPONIN INTERP: NORMAL
TROPONIN T: NORMAL NG/ML
TROPONIN T: NORMAL NG/ML
TROPONIN, HIGH SENSITIVITY: 10 NG/L (ref 0–22)
TROPONIN, HIGH SENSITIVITY: 12 NG/L (ref 0–22)
WBC # BLD: 6.7 K/UL (ref 3.5–11)
WBC # BLD: ABNORMAL 10*3/UL

## 2020-05-30 PROCEDURE — 6360000004 HC RX CONTRAST MEDICATION: Performed by: EMERGENCY MEDICINE

## 2020-05-30 PROCEDURE — 83880 ASSAY OF NATRIURETIC PEPTIDE: CPT

## 2020-05-30 PROCEDURE — 36415 COLL VENOUS BLD VENIPUNCTURE: CPT

## 2020-05-30 PROCEDURE — 93010 ELECTROCARDIOGRAM REPORT: CPT | Performed by: INTERNAL MEDICINE

## 2020-05-30 PROCEDURE — 80053 COMPREHEN METABOLIC PANEL: CPT

## 2020-05-30 PROCEDURE — 93005 ELECTROCARDIOGRAM TRACING: CPT | Performed by: EMERGENCY MEDICINE

## 2020-05-30 PROCEDURE — 2580000003 HC RX 258: Performed by: EMERGENCY MEDICINE

## 2020-05-30 PROCEDURE — 71260 CT THORAX DX C+: CPT

## 2020-05-30 PROCEDURE — 70450 CT HEAD/BRAIN W/O DYE: CPT

## 2020-05-30 PROCEDURE — 71045 X-RAY EXAM CHEST 1 VIEW: CPT

## 2020-05-30 PROCEDURE — 99285 EMERGENCY DEPT VISIT HI MDM: CPT

## 2020-05-30 PROCEDURE — 84484 ASSAY OF TROPONIN QUANT: CPT

## 2020-05-30 PROCEDURE — 85025 COMPLETE CBC W/AUTO DIFF WBC: CPT

## 2020-05-30 RX ORDER — ACETAMINOPHEN 325 MG/1
650 TABLET ORAL EVERY 4 HOURS PRN
Status: CANCELLED | OUTPATIENT
Start: 2020-05-30

## 2020-05-30 RX ORDER — SODIUM CHLORIDE 0.9 % (FLUSH) 0.9 %
10 SYRINGE (ML) INJECTION EVERY 12 HOURS SCHEDULED
Status: CANCELLED | OUTPATIENT
Start: 2020-05-30

## 2020-05-30 RX ORDER — SODIUM CHLORIDE 0.9 % (FLUSH) 0.9 %
10 SYRINGE (ML) INJECTION PRN
Status: CANCELLED | OUTPATIENT
Start: 2020-05-30

## 2020-05-30 RX ORDER — 0.9 % SODIUM CHLORIDE 0.9 %
80 INTRAVENOUS SOLUTION INTRAVENOUS ONCE
Status: COMPLETED | OUTPATIENT
Start: 2020-05-30 | End: 2020-05-30

## 2020-05-30 RX ORDER — SODIUM CHLORIDE 0.9 % (FLUSH) 0.9 %
10 SYRINGE (ML) INJECTION PRN
Status: DISCONTINUED | OUTPATIENT
Start: 2020-05-30 | End: 2020-05-30 | Stop reason: HOSPADM

## 2020-05-30 RX ADMIN — SODIUM CHLORIDE 80 ML: 9 INJECTION, SOLUTION INTRAVENOUS at 05:19

## 2020-05-30 RX ADMIN — Medication 10 ML: at 05:19

## 2020-05-30 RX ADMIN — IOVERSOL 75 ML: 741 INJECTION INTRA-ARTERIAL; INTRAVENOUS at 05:18

## 2020-05-30 ASSESSMENT — PAIN DESCRIPTION - LOCATION: LOCATION: KNEE;CHEST

## 2020-05-30 ASSESSMENT — PAIN DESCRIPTION - ORIENTATION: ORIENTATION: RIGHT

## 2020-05-30 ASSESSMENT — PAIN SCALES - GENERAL: PAINLEVEL_OUTOF10: 5

## 2020-05-30 ASSESSMENT — PAIN DESCRIPTION - DESCRIPTORS: DESCRIPTORS: ACHING

## 2020-05-30 ASSESSMENT — PAIN DESCRIPTION - FREQUENCY: FREQUENCY: CONTINUOUS

## 2020-05-30 NOTE — ED NOTES
RN spoke to pt about requesting to leave AMA. Pt stated he would not like to be admitted to the hospital for a possible stress test today. Pt stated he would like to follow up with his PCP on Monday. Pt agreeable to leaving and understands the risks of leaving.       Bernard Bolton RN  05/30/20 1948
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
## 2020-05-30 NOTE — ED PROVIDER NOTES
Yes     Frequency: Monthly or less     Drinks per session: 1 or 2     Comment: social    Drug use: No     PHYSICAL EXAM     INITIAL VITALS: BP (!) 139/90   Pulse 95   Temp 98.3 °F (36.8 °C) (Oral)   Resp 18   Ht 5' 9\" (1.753 m)   Wt 260 lb (117.9 kg)   SpO2 97%   BMI 38.40 kg/m²    Physical Exam    MEDICAL DECISION MAKING:            Labs Reviewed   CBC WITH AUTO DIFFERENTIAL - Abnormal; Notable for the following components:       Result Value    RBC 3.83 (*)     Hemoglobin 12.7 (*)     Hematocrit 37.0 (*)     Eosinophils % 9 (*)     Absolute Eos # 0.60 (*)     All other components within normal limits   COMPREHENSIVE METABOLIC PANEL W/ REFLEX TO MG FOR LOW K - Abnormal; Notable for the following components:    Glucose 127 (*)     ALT 57 (*)     All other components within normal limits   BRAIN NATRIURETIC PEPTIDE   TROPONIN   TROPONIN     EMERGENCY DEPARTMENTCOURSE:         Vitals:    Vitals:    05/30/20 0407   BP: (!) 139/90   Pulse: 95   Resp: 18   Temp: 98.3 °F (36.8 °C)   TempSrc: Oral   SpO2: 97%   Weight: 260 lb (117.9 kg)   Height: 5' 9\" (1.753 m)       The patient was given the following medications while in the emergency department:  Orders Placed This Encounter   Medications    0.9 % sodium chloride bolus    ioversol (OPTIRAY) 74 % injection 75 mL    sodium chloride flush 0.9 % injection 10 mL     CONSULTS:  None    FINAL IMPRESSION      1.  Chest pain, unspecified type          DISPOSITION/PLAN   DISPOSITION        PATIENT REFERRED TO:  Snow Stevens MD  [facility] [address]  [phone]          DISCHARGE MEDICATIONS:  New Prescriptions    No medications on file     Heather Pickard MD  Attending Emergency Physician                   Lily Burris MD  05/30/20 0002

## 2020-06-01 ENCOUNTER — TELEPHONE (OUTPATIENT)
Dept: FAMILY MEDICINE CLINIC | Age: 60
End: 2020-06-01